# Patient Record
Sex: MALE | Race: WHITE | NOT HISPANIC OR LATINO | Employment: OTHER | RURAL
[De-identification: names, ages, dates, MRNs, and addresses within clinical notes are randomized per-mention and may not be internally consistent; named-entity substitution may affect disease eponyms.]

---

## 2019-07-29 ENCOUNTER — OFFICE VISIT (OUTPATIENT)
Dept: NEUROSURGERY | Facility: CLINIC | Age: 64
End: 2019-07-29

## 2019-07-29 DIAGNOSIS — M51.36 BULGING LUMBAR DISC: ICD-10-CM

## 2019-07-29 DIAGNOSIS — M48.062 SPINAL STENOSIS OF LUMBAR REGION WITH NEUROGENIC CLAUDICATION: Primary | ICD-10-CM

## 2019-07-29 PROCEDURE — 99203 OFFICE O/P NEW LOW 30 MIN: CPT | Performed by: NEUROLOGICAL SURGERY

## 2019-07-29 RX ORDER — LORATADINE 10 MG/1
10 TABLET ORAL DAILY
Refills: 3 | COMMUNITY
Start: 2019-06-19

## 2019-07-29 RX ORDER — HEPATITIS A VACCINE, INACTIVATED 50 [IU]/ML
INJECTION, SUSPENSION INTRAMUSCULAR
Refills: 0 | COMMUNITY
Start: 2019-06-20

## 2019-07-29 RX ORDER — LEVOTHYROXINE SODIUM 0.15 MG/1
150 TABLET ORAL DAILY
Refills: 1 | COMMUNITY
Start: 2019-06-19

## 2019-07-29 RX ORDER — ATORVASTATIN CALCIUM 10 MG/1
10 TABLET, FILM COATED ORAL
Refills: 1 | COMMUNITY
Start: 2019-05-02

## 2019-07-29 RX ORDER — EMPAGLIFLOZIN AND LINAGLIPTIN 25; 5 MG/1; MG/1
1 TABLET, FILM COATED ORAL DAILY
Refills: 3 | COMMUNITY
Start: 2019-06-28

## 2019-07-29 RX ORDER — METFORMIN HYDROCHLORIDE 500 MG/1
500 TABLET, EXTENDED RELEASE ORAL
Refills: 1 | COMMUNITY
Start: 2019-06-28

## 2019-07-29 NOTE — PROGRESS NOTES
Subjective   Patient ID: Marshall Collett is a 64 y.o. male is being seen for consultation today at the request of DELORES Sepulveda  Chief Complaint: Back and left leg pain    History of Present Illness: Patient is a 64-year-old man with complaints of back pain and pain that radiates to his left hip and leg.  It occurs at night as well as when active when lifting or bending or twisting or standing for long periods.  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  He retired because the pain in his back became too much over years of time.  He recently started chiropractic he uses Motrin if necessary, no pain pills.  He has a history of diabetes.    Review of Radiographic Studies:  Lumbar MRI scan shows severe stenosis at L3-4.    The following portions of the patient's history were reviewed, updated as appropriate and approved: allergies, current medications, past family history, past medical history, past social history, past surgical history, review of systems and problem list.    Review of Systems   Constitutional: Negative for activity change, appetite change, chills, diaphoresis, fatigue, fever and unexpected weight change.   HENT: Negative for congestion, dental problem, drooling, ear discharge, ear pain, facial swelling, hearing loss, mouth sores, nosebleeds, postnasal drip, rhinorrhea, sinus pressure, sneezing, sore throat, tinnitus, trouble swallowing and voice change.    Eyes: Negative for photophobia, pain, discharge, redness, itching and visual disturbance.   Respiratory: Negative for apnea, cough, choking, chest tightness, shortness of breath, wheezing and stridor.    Cardiovascular: Negative for chest pain, palpitations and leg swelling.   Gastrointestinal: Negative for abdominal distention, abdominal pain, anal bleeding, blood in stool, constipation, diarrhea, nausea, rectal pain and vomiting.   Endocrine: Negative for cold intolerance, heat intolerance, polydipsia, polyphagia and polyuria.    Genitourinary: Negative for decreased urine volume, difficulty urinating, dysuria, enuresis, flank pain, frequency, genital sores, hematuria and urgency.   Musculoskeletal: Positive for back pain. Negative for arthralgias, gait problem, joint swelling, myalgias, neck pain and neck stiffness.   Skin: Negative for color change, pallor, rash and wound.   Allergic/Immunologic: Negative for environmental allergies, food allergies and immunocompromised state.   Neurological: Negative for dizziness, tremors, seizures, syncope, facial asymmetry, speech difficulty, weakness, light-headedness, numbness and headaches.   Hematological: Negative for adenopathy. Does not bruise/bleed easily.   Psychiatric/Behavioral: Negative for agitation, behavioral problems, confusion, decreased concentration, dysphoric mood, hallucinations, self-injury, sleep disturbance and suicidal ideas. The patient is not nervous/anxious and is not hyperactive.        Objective     NEUROLOGICAL EXAMINATION:      MENTAL STATUS:  Alert and oriented.  Speech intact.  Recent and remote memory intact.      CRANIAL NERVES:  Cranial nerve II:  Visual fields are full.  Cranial nerves III, IV and VI:  PERRLADC.  Extraocular movements are intact.  Nystagmus is not present.  Cranial nerve V:  Facial sensation is intact.  Cranial nerve VII:  Muscles of facial expression reveal no asymmetry.  Cranial nerve VIII:  Hearing is intact.  Cranial nerves IX and X:  Palate elevates symmetrically.  Cranial nerve XI:  Shoulder shrug is intact.  Cranial nerve XII:  Tongue is midline without evidence of atrophy or fasciculation.    MUSCULOSKELETAL:  SLR negative bilaterally    MOTOR: Dorsiflexion and plantarflexion intact bilaterally    SENSATION: No sensory loss over the feet    REFLEXES:  DTR absent bilateral knee and ankle      Assessment   Stenosis L3-4.       Plan   Continue chiropractic over the next 2 months.  Follow-up in [place] in 2 months.  He is a candidate for  minimal access laminectomy if symptoms do not respond to conservative management and require surgical treatment.       Dilip Brothers MD

## 2019-09-16 ENCOUNTER — OFFICE VISIT (OUTPATIENT)
Dept: NEUROSURGERY | Facility: CLINIC | Age: 64
End: 2019-09-16

## 2019-09-16 DIAGNOSIS — M48.062 SPINAL STENOSIS OF LUMBAR REGION WITH NEUROGENIC CLAUDICATION: Primary | ICD-10-CM

## 2019-09-16 PROCEDURE — 99213 OFFICE O/P EST LOW 20 MIN: CPT | Performed by: NEUROLOGICAL SURGERY

## 2019-09-16 NOTE — PROGRESS NOTES
Subjective Marshall Collett is a 64 y.o. male who presents for follow up of L3-4 stenosis.     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  Chiropractic has not been successful in alleviating symptoms.  MRI scan shows severe stenosis at L3-4.  He uses anti-inflammatories, not narcotics.    The following portions of the patient's history were reviewed, updated as appropriate and approved: allergies, current medications, past family history, past medical history, past social history, past surgical history, review of systems and problem list.     Review of Systems   Constitutional: Negative for activity change, appetite change, chills, diaphoresis, fatigue, fever and unexpected weight change.   HENT: Negative for congestion, dental problem, drooling, ear discharge, ear pain, facial swelling, hearing loss, mouth sores, nosebleeds, postnasal drip, rhinorrhea, sinus pressure, sneezing, sore throat, tinnitus, trouble swallowing and voice change.    Eyes: Negative for photophobia, pain, discharge, redness, itching and visual disturbance.   Respiratory: Negative for apnea, cough, choking, chest tightness, shortness of breath, wheezing and stridor.    Cardiovascular: Negative for chest pain, palpitations and leg swelling.   Gastrointestinal: Negative for abdominal distention, abdominal pain, anal bleeding, blood in stool, constipation, diarrhea, nausea, rectal pain and vomiting.   Endocrine: Negative for cold intolerance, heat intolerance, polydipsia, polyphagia and polyuria.   Genitourinary: Negative for decreased urine volume, difficulty urinating, dysuria, enuresis, flank pain, frequency, genital sores, hematuria and urgency.   Musculoskeletal: Positive for back pain. Negative for arthralgias, gait problem, joint swelling, myalgias, neck pain and neck stiffness.   Skin: Negative for color change, pallor, rash and wound.    Allergic/Immunologic: Negative for environmental allergies, food allergies and immunocompromised state.   Neurological: Negative for dizziness, tremors, seizures, syncope, facial asymmetry, speech difficulty, weakness, light-headedness, numbness and headaches.   Hematological: Negative for adenopathy. Does not bruise/bleed easily.   Psychiatric/Behavioral: Negative for agitation, behavioral problems, confusion, decreased concentration, dysphoric mood, hallucinations, self-injury, sleep disturbance and suicidal ideas. The patient is not nervous/anxious and is not hyperactive.        Objective    NEUROLOGICAL EXAMINATION:    Intact dorsiflexion and plantar flexion bilaterally.  SLR negative.  No sensory loss.  Absent reflexes in the lower extremities.    Assessment   Severe stenosis L3-4, left back and leg pain.       Plan   It is time to consider minimal access decompressive laminectomy at L3-4.  Have discussed this with him.  He will give it consideration and if he decides to have surgery let us know, at which time we will schedule the surgery.       Dilip Brothers MD

## 2019-11-19 ENCOUNTER — PREP FOR SURGERY (OUTPATIENT)
Dept: OTHER | Facility: HOSPITAL | Age: 64
End: 2019-11-19

## 2019-11-19 DIAGNOSIS — R93.7 MUSCULOSKELETAL SYSTEM IMAGING ABNORMALITY: ICD-10-CM

## 2019-11-19 DIAGNOSIS — M48.062 SPINAL STENOSIS OF LUMBAR REGION WITH NEUROGENIC CLAUDICATION: Primary | ICD-10-CM

## 2019-11-19 RX ORDER — IBUPROFEN 800 MG/1
800 TABLET ORAL ONCE
Status: CANCELLED | OUTPATIENT
Start: 2019-11-19 | End: 2019-11-19

## 2019-11-19 RX ORDER — HYDROCODONE BITARTRATE AND ACETAMINOPHEN 7.5; 325 MG/1; MG/1
1 TABLET ORAL ONCE
Status: CANCELLED | OUTPATIENT
Start: 2019-11-19 | End: 2019-11-19

## 2019-11-19 RX ORDER — CEFAZOLIN SODIUM 2 G/100ML
2 INJECTION, SOLUTION INTRAVENOUS ONCE
Status: CANCELLED | OUTPATIENT
Start: 2019-11-19 | End: 2019-11-19

## 2019-11-19 RX ORDER — ACETAMINOPHEN 325 MG/1
650 TABLET ORAL ONCE
Status: CANCELLED | OUTPATIENT
Start: 2019-11-19 | End: 2019-11-19

## 2019-12-12 ENCOUNTER — PREP FOR SURGERY (OUTPATIENT)
Dept: OTHER | Facility: HOSPITAL | Age: 64
End: 2019-12-12

## 2020-01-09 ENCOUNTER — ANESTHESIA EVENT (OUTPATIENT)
Dept: PERIOP | Facility: HOSPITAL | Age: 65
End: 2020-01-09

## 2020-01-09 ENCOUNTER — APPOINTMENT (OUTPATIENT)
Dept: PREADMISSION TESTING | Facility: HOSPITAL | Age: 65
End: 2020-01-09

## 2020-01-09 VITALS — WEIGHT: 200.4 LBS | BODY MASS INDEX: 25.72 KG/M2 | HEIGHT: 74 IN

## 2020-01-09 DIAGNOSIS — R93.7 MUSCULOSKELETAL SYSTEM IMAGING ABNORMALITY: ICD-10-CM

## 2020-01-09 DIAGNOSIS — M48.062 SPINAL STENOSIS OF LUMBAR REGION WITH NEUROGENIC CLAUDICATION: ICD-10-CM

## 2020-01-09 LAB
ANION GAP SERPL CALCULATED.3IONS-SCNC: 13 MMOL/L (ref 5–15)
BUN BLD-MCNC: 26 MG/DL (ref 8–23)
BUN/CREAT SERPL: 29.5 (ref 7–25)
CALCIUM SPEC-SCNC: 10.4 MG/DL (ref 8.6–10.5)
CHLORIDE SERPL-SCNC: 98 MMOL/L (ref 98–107)
CO2 SERPL-SCNC: 23 MMOL/L (ref 22–29)
CREAT BLD-MCNC: 0.88 MG/DL (ref 0.76–1.27)
DEPRECATED RDW RBC AUTO: 40.4 FL (ref 37–54)
ERYTHROCYTE [DISTWIDTH] IN BLOOD BY AUTOMATED COUNT: 12.7 % (ref 12.3–15.4)
GFR SERPL CREATININE-BSD FRML MDRD: 87 ML/MIN/1.73
GLUCOSE BLD-MCNC: 229 MG/DL (ref 65–99)
HBA1C MFR BLD: 9.4 % (ref 4.8–5.6)
HCT VFR BLD AUTO: 48.7 % (ref 37.5–51)
HGB BLD-MCNC: 16.5 G/DL (ref 13–17.7)
MCH RBC QN AUTO: 29.6 PG (ref 26.6–33)
MCHC RBC AUTO-ENTMCNC: 33.9 G/DL (ref 31.5–35.7)
MCV RBC AUTO: 87.3 FL (ref 79–97)
MRSA DNA SPEC QL NAA+PROBE: NEGATIVE
PLATELET # BLD AUTO: 205 10*3/MM3 (ref 140–450)
PMV BLD AUTO: 9.5 FL (ref 6–12)
POTASSIUM BLD-SCNC: 4.5 MMOL/L (ref 3.5–5.2)
RBC # BLD AUTO: 5.58 10*6/MM3 (ref 4.14–5.8)
SODIUM BLD-SCNC: 134 MMOL/L (ref 136–145)
WBC NRBC COR # BLD: 7.99 10*3/MM3 (ref 3.4–10.8)

## 2020-01-09 PROCEDURE — 87641 MR-STAPH DNA AMP PROBE: CPT | Performed by: NEUROLOGICAL SURGERY

## 2020-01-09 PROCEDURE — 80048 BASIC METABOLIC PNL TOTAL CA: CPT | Performed by: NEUROLOGICAL SURGERY

## 2020-01-09 PROCEDURE — 93010 ELECTROCARDIOGRAM REPORT: CPT | Performed by: INTERNAL MEDICINE

## 2020-01-09 PROCEDURE — 36415 COLL VENOUS BLD VENIPUNCTURE: CPT

## 2020-01-09 PROCEDURE — 83036 HEMOGLOBIN GLYCOSYLATED A1C: CPT | Performed by: ANESTHESIOLOGY

## 2020-01-09 PROCEDURE — 93005 ELECTROCARDIOGRAM TRACING: CPT

## 2020-01-09 PROCEDURE — 85027 COMPLETE CBC AUTOMATED: CPT | Performed by: NEUROLOGICAL SURGERY

## 2020-01-09 RX ORDER — FAMOTIDINE 10 MG/ML
20 INJECTION, SOLUTION INTRAVENOUS ONCE
Status: CANCELLED | OUTPATIENT
Start: 2020-01-09 | End: 2020-01-09

## 2020-01-09 NOTE — PAT
BACTROBAN APPLIED TO EACH NOSTRIL DURING PAT VISIT     Patient to apply Chlorhexadine wipes  to surgical area (as instructed) the night before procedure and the AM of procedure. Wipes provided.    Patient instructed to drink 20 ounces (or until full) of Gatorade and it needs to be completed 1 hour before given arrival time for procedure (NO RED Gatorade)    Patient verbalized understanding.    Colon screening information booklet given to patient during PAT visit    IZABEL REQUESTED BEFORE PROCEDURE. NOTE LEFT FOR PREOP.

## 2020-01-10 ENCOUNTER — ANESTHESIA (OUTPATIENT)
Dept: PERIOP | Facility: HOSPITAL | Age: 65
End: 2020-01-10

## 2020-01-10 ENCOUNTER — HOSPITAL ENCOUNTER (OUTPATIENT)
Facility: HOSPITAL | Age: 65
Discharge: HOME | End: 2020-01-11
Attending: NEUROLOGICAL SURGERY | Admitting: NEUROLOGICAL SURGERY

## 2020-01-10 ENCOUNTER — APPOINTMENT (OUTPATIENT)
Dept: GENERAL RADIOLOGY | Facility: HOSPITAL | Age: 65
End: 2020-01-10

## 2020-01-10 DIAGNOSIS — Z74.09 IMPAIRED MOBILITY AND ADLS: Primary | ICD-10-CM

## 2020-01-10 DIAGNOSIS — Z78.9 IMPAIRED MOBILITY AND ADLS: Primary | ICD-10-CM

## 2020-01-10 PROBLEM — M48.061 LUMBAR STENOSIS: Status: ACTIVE | Noted: 2020-01-10

## 2020-01-10 LAB
GLUCOSE BLDC GLUCOMTR-MCNC: 217 MG/DL (ref 70–130)
GLUCOSE BLDC GLUCOMTR-MCNC: 277 MG/DL (ref 70–130)

## 2020-01-10 PROCEDURE — A9270 NON-COVERED ITEM OR SERVICE: HCPCS | Performed by: NEUROLOGICAL SURGERY

## 2020-01-10 PROCEDURE — 63710000001 ATORVASTATIN 10 MG TABLET: Performed by: NEUROLOGICAL SURGERY

## 2020-01-10 PROCEDURE — 25010000002 NEOSTIGMINE 10 MG/10ML SOLUTION: Performed by: NURSE ANESTHETIST, CERTIFIED REGISTERED

## 2020-01-10 PROCEDURE — 63710000001 ACETAMINOPHEN 325 MG TABLET: Performed by: NEUROLOGICAL SURGERY

## 2020-01-10 PROCEDURE — 25010000003 CEFAZOLIN IN DEXTROSE 2-4 GM/100ML-% SOLUTION: Performed by: NEUROLOGICAL SURGERY

## 2020-01-10 PROCEDURE — 25010000002 PROPOFOL 10 MG/ML EMULSION: Performed by: NURSE ANESTHETIST, CERTIFIED REGISTERED

## 2020-01-10 PROCEDURE — 25010000002 PROMETHAZINE PER 50 MG: Performed by: NURSE ANESTHETIST, CERTIFIED REGISTERED

## 2020-01-10 PROCEDURE — 94799 UNLISTED PULMONARY SVC/PX: CPT

## 2020-01-10 PROCEDURE — 76000 FLUOROSCOPY <1 HR PHYS/QHP: CPT

## 2020-01-10 PROCEDURE — 25010000002 FENTANYL CITRATE (PF) 250 MCG/5ML SOLUTION: Performed by: NURSE ANESTHETIST, CERTIFIED REGISTERED

## 2020-01-10 PROCEDURE — 25010000002 ONDANSETRON PER 1 MG: Performed by: NURSE ANESTHETIST, CERTIFIED REGISTERED

## 2020-01-10 PROCEDURE — 82962 GLUCOSE BLOOD TEST: CPT

## 2020-01-10 PROCEDURE — 63047 LAM FACETEC & FORAMOT LUMBAR: CPT | Performed by: NEUROLOGICAL SURGERY

## 2020-01-10 PROCEDURE — 25010000002 DEXAMETHASONE PER 1 MG: Performed by: NURSE ANESTHETIST, CERTIFIED REGISTERED

## 2020-01-10 PROCEDURE — 25010000002 METHYLPREDNISOLONE PER 40 MG: Performed by: NEUROLOGICAL SURGERY

## 2020-01-10 PROCEDURE — S0260 H&P FOR SURGERY: HCPCS | Performed by: NEUROLOGICAL SURGERY

## 2020-01-10 PROCEDURE — 63710000001 METFORMIN ER 500 MG TABLET SUSTAINED-RELEASE 24 HOUR: Performed by: NEUROLOGICAL SURGERY

## 2020-01-10 PROCEDURE — 25010000002 HYDROMORPHONE PER 4 MG: Performed by: NURSE ANESTHETIST, CERTIFIED REGISTERED

## 2020-01-10 RX ORDER — SODIUM CHLORIDE 0.9 % (FLUSH) 0.9 %
10 SYRINGE (ML) INJECTION AS NEEDED
Status: DISCONTINUED | OUTPATIENT
Start: 2020-01-10 | End: 2020-01-10 | Stop reason: HOSPADM

## 2020-01-10 RX ORDER — NEOSTIGMINE METHYLSULFATE 1 MG/ML
INJECTION, SOLUTION INTRAVENOUS AS NEEDED
Status: DISCONTINUED | OUTPATIENT
Start: 2020-01-10 | End: 2020-01-10 | Stop reason: SURG

## 2020-01-10 RX ORDER — METFORMIN HYDROCHLORIDE 500 MG/1
500 TABLET, EXTENDED RELEASE ORAL
Status: DISCONTINUED | OUTPATIENT
Start: 2020-01-10 | End: 2020-01-11 | Stop reason: HOSPADM

## 2020-01-10 RX ORDER — ATRACURIUM BESYLATE 10 MG/ML
INJECTION, SOLUTION INTRAVENOUS AS NEEDED
Status: DISCONTINUED | OUTPATIENT
Start: 2020-01-10 | End: 2020-01-10 | Stop reason: SURG

## 2020-01-10 RX ORDER — HYDROCODONE BITARTRATE AND ACETAMINOPHEN 7.5; 325 MG/1; MG/1
1 TABLET ORAL ONCE
Status: COMPLETED | OUTPATIENT
Start: 2020-01-10 | End: 2020-01-10

## 2020-01-10 RX ORDER — SODIUM CHLORIDE 0.9 % (FLUSH) 0.9 %
3 SYRINGE (ML) INJECTION EVERY 12 HOURS SCHEDULED
Status: DISCONTINUED | OUTPATIENT
Start: 2020-01-10 | End: 2020-01-11 | Stop reason: HOSPADM

## 2020-01-10 RX ORDER — EPHEDRINE SULFATE 50 MG/ML
INJECTION, SOLUTION INTRAVENOUS AS NEEDED
Status: DISCONTINUED | OUTPATIENT
Start: 2020-01-10 | End: 2020-01-10 | Stop reason: SURG

## 2020-01-10 RX ORDER — HYDROMORPHONE HYDROCHLORIDE 1 MG/ML
0.5 INJECTION, SOLUTION INTRAMUSCULAR; INTRAVENOUS; SUBCUTANEOUS
Status: DISCONTINUED | OUTPATIENT
Start: 2020-01-10 | End: 2020-01-10 | Stop reason: HOSPADM

## 2020-01-10 RX ORDER — AMOXICILLIN 250 MG
1 CAPSULE ORAL NIGHTLY PRN
Status: DISCONTINUED | OUTPATIENT
Start: 2020-01-10 | End: 2020-01-11 | Stop reason: HOSPADM

## 2020-01-10 RX ORDER — ONDANSETRON 2 MG/ML
INJECTION INTRAMUSCULAR; INTRAVENOUS AS NEEDED
Status: DISCONTINUED | OUTPATIENT
Start: 2020-01-10 | End: 2020-01-10 | Stop reason: SURG

## 2020-01-10 RX ORDER — LIDOCAINE HYDROCHLORIDE 10 MG/ML
0.5 INJECTION, SOLUTION EPIDURAL; INFILTRATION; INTRACAUDAL; PERINEURAL ONCE AS NEEDED
Status: COMPLETED | OUTPATIENT
Start: 2020-01-10 | End: 2020-01-10

## 2020-01-10 RX ORDER — PROMETHAZINE HYDROCHLORIDE 25 MG/ML
6.25 INJECTION, SOLUTION INTRAMUSCULAR; INTRAVENOUS
Status: DISCONTINUED | OUTPATIENT
Start: 2020-01-10 | End: 2020-01-10 | Stop reason: HOSPADM

## 2020-01-10 RX ORDER — OXYCODONE AND ACETAMINOPHEN 10; 325 MG/1; MG/1
1 TABLET ORAL EVERY 4 HOURS PRN
Status: DISCONTINUED | OUTPATIENT
Start: 2020-01-10 | End: 2020-01-11 | Stop reason: HOSPADM

## 2020-01-10 RX ORDER — LEVOTHYROXINE SODIUM 0.15 MG/1
150 TABLET ORAL
Status: DISCONTINUED | OUTPATIENT
Start: 2020-01-10 | End: 2020-01-11 | Stop reason: HOSPADM

## 2020-01-10 RX ORDER — BUPIVACAINE HYDROCHLORIDE AND EPINEPHRINE 2.5; 5 MG/ML; UG/ML
INJECTION, SOLUTION EPIDURAL; INFILTRATION; INTRACAUDAL; PERINEURAL AS NEEDED
Status: DISCONTINUED | OUTPATIENT
Start: 2020-01-10 | End: 2020-01-10 | Stop reason: HOSPADM

## 2020-01-10 RX ORDER — FENTANYL CITRATE 50 UG/ML
50 INJECTION, SOLUTION INTRAMUSCULAR; INTRAVENOUS
Status: DISCONTINUED | OUTPATIENT
Start: 2020-01-10 | End: 2020-01-10 | Stop reason: HOSPADM

## 2020-01-10 RX ORDER — ONDANSETRON 2 MG/ML
4 INJECTION INTRAMUSCULAR; INTRAVENOUS ONCE AS NEEDED
Status: DISCONTINUED | OUTPATIENT
Start: 2020-01-10 | End: 2020-01-10 | Stop reason: HOSPADM

## 2020-01-10 RX ORDER — SODIUM CHLORIDE 0.9 % (FLUSH) 0.9 %
10 SYRINGE (ML) INJECTION AS NEEDED
Status: DISCONTINUED | OUTPATIENT
Start: 2020-01-10 | End: 2020-01-11 | Stop reason: HOSPADM

## 2020-01-10 RX ORDER — LIDOCAINE HYDROCHLORIDE 10 MG/ML
INJECTION, SOLUTION EPIDURAL; INFILTRATION; INTRACAUDAL; PERINEURAL AS NEEDED
Status: DISCONTINUED | OUTPATIENT
Start: 2020-01-10 | End: 2020-01-10 | Stop reason: SURG

## 2020-01-10 RX ORDER — ONDANSETRON 2 MG/ML
4 INJECTION INTRAMUSCULAR; INTRAVENOUS EVERY 6 HOURS PRN
Status: DISCONTINUED | OUTPATIENT
Start: 2020-01-10 | End: 2020-01-11 | Stop reason: HOSPADM

## 2020-01-10 RX ORDER — ONDANSETRON 4 MG/1
4 TABLET, FILM COATED ORAL EVERY 6 HOURS PRN
Status: DISCONTINUED | OUTPATIENT
Start: 2020-01-10 | End: 2020-01-11 | Stop reason: HOSPADM

## 2020-01-10 RX ORDER — SODIUM CHLORIDE 0.9 % (FLUSH) 0.9 %
10 SYRINGE (ML) INJECTION EVERY 12 HOURS SCHEDULED
Status: DISCONTINUED | OUTPATIENT
Start: 2020-01-10 | End: 2020-01-10 | Stop reason: HOSPADM

## 2020-01-10 RX ORDER — FENTANYL CITRATE 50 UG/ML
INJECTION, SOLUTION INTRAMUSCULAR; INTRAVENOUS AS NEEDED
Status: DISCONTINUED | OUTPATIENT
Start: 2020-01-10 | End: 2020-01-10 | Stop reason: SURG

## 2020-01-10 RX ORDER — IBUPROFEN 800 MG/1
800 TABLET ORAL ONCE
Status: COMPLETED | OUTPATIENT
Start: 2020-01-10 | End: 2020-01-10

## 2020-01-10 RX ORDER — TEMAZEPAM 15 MG/1
15 CAPSULE ORAL NIGHTLY PRN
Status: DISCONTINUED | OUTPATIENT
Start: 2020-01-10 | End: 2020-01-11 | Stop reason: HOSPADM

## 2020-01-10 RX ORDER — LABETALOL HYDROCHLORIDE 5 MG/ML
5 INJECTION, SOLUTION INTRAVENOUS
Status: DISCONTINUED | OUTPATIENT
Start: 2020-01-10 | End: 2020-01-10 | Stop reason: HOSPADM

## 2020-01-10 RX ORDER — METHYLPREDNISOLONE ACETATE 40 MG/ML
INJECTION, SUSPENSION INTRA-ARTICULAR; INTRALESIONAL; INTRAMUSCULAR; SOFT TISSUE AS NEEDED
Status: DISCONTINUED | OUTPATIENT
Start: 2020-01-10 | End: 2020-01-10 | Stop reason: HOSPADM

## 2020-01-10 RX ORDER — ACETAMINOPHEN 325 MG/1
650 TABLET ORAL ONCE
Status: COMPLETED | OUTPATIENT
Start: 2020-01-10 | End: 2020-01-10

## 2020-01-10 RX ORDER — MAGNESIUM HYDROXIDE 1200 MG/15ML
LIQUID ORAL AS NEEDED
Status: DISCONTINUED | OUTPATIENT
Start: 2020-01-10 | End: 2020-01-10 | Stop reason: HOSPADM

## 2020-01-10 RX ORDER — PROPOFOL 10 MG/ML
VIAL (ML) INTRAVENOUS AS NEEDED
Status: DISCONTINUED | OUTPATIENT
Start: 2020-01-10 | End: 2020-01-10 | Stop reason: SURG

## 2020-01-10 RX ORDER — CEFAZOLIN SODIUM 2 G/100ML
2 INJECTION, SOLUTION INTRAVENOUS ONCE
Status: COMPLETED | OUTPATIENT
Start: 2020-01-10 | End: 2020-01-10

## 2020-01-10 RX ORDER — FAMOTIDINE 20 MG/1
20 TABLET, FILM COATED ORAL ONCE
Status: COMPLETED | OUTPATIENT
Start: 2020-01-10 | End: 2020-01-10

## 2020-01-10 RX ORDER — DEXAMETHASONE SODIUM PHOSPHATE 4 MG/ML
INJECTION, SOLUTION INTRA-ARTICULAR; INTRALESIONAL; INTRAMUSCULAR; INTRAVENOUS; SOFT TISSUE AS NEEDED
Status: DISCONTINUED | OUTPATIENT
Start: 2020-01-10 | End: 2020-01-10 | Stop reason: SURG

## 2020-01-10 RX ORDER — GLYCOPYRROLATE 0.2 MG/ML
INJECTION INTRAMUSCULAR; INTRAVENOUS AS NEEDED
Status: DISCONTINUED | OUTPATIENT
Start: 2020-01-10 | End: 2020-01-10 | Stop reason: SURG

## 2020-01-10 RX ORDER — ACETAMINOPHEN 325 MG/1
650 TABLET ORAL EVERY 8 HOURS
Status: DISCONTINUED | OUTPATIENT
Start: 2020-01-10 | End: 2020-01-11 | Stop reason: HOSPADM

## 2020-01-10 RX ORDER — ATORVASTATIN CALCIUM 10 MG/1
10 TABLET, FILM COATED ORAL NIGHTLY
Status: DISCONTINUED | OUTPATIENT
Start: 2020-01-10 | End: 2020-01-11 | Stop reason: HOSPADM

## 2020-01-10 RX ORDER — HYDROCODONE BITARTRATE AND ACETAMINOPHEN 7.5; 325 MG/1; MG/1
1 TABLET ORAL EVERY 4 HOURS PRN
Status: DISCONTINUED | OUTPATIENT
Start: 2020-01-10 | End: 2020-01-11 | Stop reason: HOSPADM

## 2020-01-10 RX ORDER — SODIUM CHLORIDE, SODIUM LACTATE, POTASSIUM CHLORIDE, CALCIUM CHLORIDE 600; 310; 30; 20 MG/100ML; MG/100ML; MG/100ML; MG/100ML
9 INJECTION, SOLUTION INTRAVENOUS CONTINUOUS
Status: DISCONTINUED | OUTPATIENT
Start: 2020-01-10 | End: 2020-01-11 | Stop reason: HOSPADM

## 2020-01-10 RX ORDER — CYCLOBENZAPRINE HCL 10 MG
10 TABLET ORAL 3 TIMES DAILY PRN
Status: DISCONTINUED | OUTPATIENT
Start: 2020-01-10 | End: 2020-01-11 | Stop reason: HOSPADM

## 2020-01-10 RX ADMIN — FENTANYL CITRATE 100 MCG: 50 INJECTION, SOLUTION INTRAMUSCULAR; INTRAVENOUS at 09:34

## 2020-01-10 RX ADMIN — SODIUM CHLORIDE, PRESERVATIVE FREE 3 ML: 5 INJECTION INTRAVENOUS at 20:28

## 2020-01-10 RX ADMIN — IBUPROFEN 800 MG: 800 TABLET, FILM COATED ORAL at 07:24

## 2020-01-10 RX ADMIN — FAMOTIDINE 20 MG: 20 TABLET ORAL at 07:26

## 2020-01-10 RX ADMIN — NEOSTIGMINE METHYLSULFATE 2 MG: 1 INJECTION, SOLUTION INTRAVENOUS at 09:32

## 2020-01-10 RX ADMIN — HYDROMORPHONE HYDROCHLORIDE 0.5 MG: 1 INJECTION, SOLUTION INTRAMUSCULAR; INTRAVENOUS; SUBCUTANEOUS at 10:27

## 2020-01-10 RX ADMIN — ONDANSETRON 4 MG: 2 INJECTION INTRAMUSCULAR; INTRAVENOUS at 09:32

## 2020-01-10 RX ADMIN — FENTANYL CITRATE 150 MCG: 50 INJECTION, SOLUTION INTRAMUSCULAR; INTRAVENOUS at 08:36

## 2020-01-10 RX ADMIN — ATRACURIUM BESYLATE 50 MG: 10 INJECTION, SOLUTION INTRAVENOUS at 08:36

## 2020-01-10 RX ADMIN — ACETAMINOPHEN 650 MG: 325 TABLET ORAL at 20:27

## 2020-01-10 RX ADMIN — PROPOFOL 200 MG: 10 INJECTION, EMULSION INTRAVENOUS at 08:36

## 2020-01-10 RX ADMIN — EPHEDRINE SULFATE 10 MG: 50 INJECTION INTRAVENOUS at 09:07

## 2020-01-10 RX ADMIN — HYDROCODONE BITARTRATE AND ACETAMINOPHEN 1 TABLET: 7.5; 325 TABLET ORAL at 07:26

## 2020-01-10 RX ADMIN — METFORMIN HYDROCHLORIDE 500 MG: 500 TABLET, EXTENDED RELEASE ORAL at 12:45

## 2020-01-10 RX ADMIN — CEFAZOLIN SODIUM 2 G: 2 INJECTION, SOLUTION INTRAVENOUS at 08:45

## 2020-01-10 RX ADMIN — SODIUM CHLORIDE, POTASSIUM CHLORIDE, SODIUM LACTATE AND CALCIUM CHLORIDE: 600; 310; 30; 20 INJECTION, SOLUTION INTRAVENOUS at 09:33

## 2020-01-10 RX ADMIN — SODIUM CHLORIDE, POTASSIUM CHLORIDE, SODIUM LACTATE AND CALCIUM CHLORIDE: 600; 310; 30; 20 INJECTION, SOLUTION INTRAVENOUS at 07:17

## 2020-01-10 RX ADMIN — ATORVASTATIN CALCIUM 10 MG: 10 TABLET, FILM COATED ORAL at 20:27

## 2020-01-10 RX ADMIN — PROMETHAZINE HYDROCHLORIDE 6.25 MG: 25 INJECTION INTRAMUSCULAR; INTRAVENOUS at 10:20

## 2020-01-10 RX ADMIN — ACETAMINOPHEN 650 MG: 325 TABLET ORAL at 13:30

## 2020-01-10 RX ADMIN — ACETAMINOPHEN 650 MG: 325 TABLET ORAL at 07:24

## 2020-01-10 RX ADMIN — GLYCOPYRROLATE 0.2 MG: 0.2 INJECTION, SOLUTION INTRAMUSCULAR; INTRAVENOUS at 09:32

## 2020-01-10 RX ADMIN — LIDOCAINE HYDROCHLORIDE 0.4 ML: 10 INJECTION, SOLUTION EPIDURAL; INFILTRATION; INTRACAUDAL; PERINEURAL at 07:25

## 2020-01-10 RX ADMIN — DEXAMETHASONE SODIUM PHOSPHATE 4 MG: 4 INJECTION, SOLUTION INTRAMUSCULAR; INTRAVENOUS at 08:41

## 2020-01-10 RX ADMIN — LIDOCAINE HYDROCHLORIDE 60 MG: 10 INJECTION, SOLUTION EPIDURAL; INFILTRATION; INTRACAUDAL; PERINEURAL at 08:36

## 2020-01-10 NOTE — ANESTHESIA PROCEDURE NOTES
Airway  Urgency: elective    Airway not difficult    General Information and Staff    Patient location during procedure: OR    Indications and Patient Condition  Indications for airway management: airway protection    Preoxygenated: yes  MILS not maintained throughout  Mask difficulty assessment: 1 - vent by mask    Final Airway Details  Final airway type: endotracheal airway      Successful airway: ETT  Cuffed: yes   Successful intubation technique: direct laryngoscopy  Endotracheal tube insertion site: oral  Blade: Marco A  Blade size: 3  ETT size (mm): 7.5  Cormack-Lehane Classification: grade I - full view of glottis  Placement verified by: chest auscultation and capnometry   Number of attempts at approach: 1

## 2020-01-10 NOTE — OP NOTE
OPERATIVE REPORT    DATE OF OPERATION: 1/10/2020    PREOPERATIVE DIAGNOSIS: Lumbar stenosis L3-4    POSTOPERATIVE DIAGNOSIS: Same    PROCEDURE PERFORMED: Lumbar laminectomy L3-4    SURGEON: Dilip Brothers MD    ASSISTANT: Shima Palacio PA-C    INDICATIONS: The patient had back pain radiating hips and legs, principally on the left.  MRI scan showed significant lumbar stenosis at L3-4 causing neurogenic claudication.  Decompression using minimal access technique was elected.    DESCRIPTON OF PROCEDURE: Surgery was performed under general anesthesia in the prone position on the laminectomy frame.  The back was prepared sterilely and draped.  The left lower L3 lamina was identified with a spinal needle and an AP fluoroscopic image.  A short skin incision was made and a guidewire and dilators were used to place a 5 cm length x 20 mm width tubular retractor.  AP fluoroscopy confirmed this to be the L3-4 level on the left.    A high-speed drill was used to perform the laminectomy, beginning on the ipsilateral side, and removing the inferior lamina of L 3.  Drilling was extended to the floor of the lateral recess on the ipsilateral side, thinning the medial margin of the facet, then the tube was angled across the midline to the contralateral side and drilling continued, removing the lamina until the ligamentum flavum was exposed and the lateral recess on the far side reached. The ligamentum flavum was exposed bilaterally. The laminectomy was extended cranially to the point where the ligamentum flavum thinned away and epidural fat was visible. The exposure was extended caudally to the level of the superior lamina of L 4.  A 3 mm punch was used to remove the ligamentum flavum, beginning at the midline and partially removing the ligamentum on the ipsilateral side to expose the dura, then removing the ligamentum extensively into the lateral recess on the contralateral side until the floor of the lateral recess and the epidural  veins were seen.  Gelfoam was placed in the far lateral recess for hemostasis.  The tube was angled back to the ipsilateral side and the ligamentum flavum excision completed with the Kerrison punch until the floor of the lateral recess and the epidural veins were seen and all dural compression was confirmed visually to be alleviated.  Removal of the ligament was extended caudally to the superior lamina of L 4.  The bone was waxed as necessary for hemostasis.  The site was irrigated and Gelfoam used as necessary to complete hemostasis.  Gelfoam was placed over the laminectomy site.  The tubular retractor was removed.  Marcaine was injected in the paraspinous muscle.  Subcuticular Vicryl closure was performed and glue was applied to the skin.  Blood loss was 20 milliliters.    Dilip Brothers M.D.

## 2020-01-10 NOTE — BRIEF OP NOTE
LUMBAR DISCECTOMY MICRO ENDOSCOPIC  Progress Note    Bao SALLY Tinajerott  1/10/2020    Pre-op Diagnosis:   Spinal stenosis of lumbar region with neurogenic claudication [M48.062]       Post-Op Diagnosis Codes:     * Spinal stenosis of lumbar region with neurogenic claudication [M48.062]    Procedure/CPT® Codes:      Procedure(s):  LUMBAR LAMINECTOMY L3-4    Surgeon(s):  Dilip Brothers MD    Anesthesia: General    Staff:   Circulator: Lesli Hoang RN  Scrub Person: Leticia Castro Paula M  Nursing Assistant: Nura Arredondo  Assistant: Shima Palacio PA-C    Estimated Blood Loss: 25 cc    Urine Voided: * No values recorded between 1/10/2020  8:32 AM and 1/10/2020  9:34 AM *    Specimens:                None          Drains: * No LDAs found *    Findings: Stenosis L3-4    Complications: None      Dilip Brothers MD     Date: 1/10/2020  Time: 9:34 AM

## 2020-01-10 NOTE — ANESTHESIA PREPROCEDURE EVALUATION
Anesthesia Evaluation     Patient summary reviewed and Nursing notes reviewed   no history of anesthetic complications:  NPO Solid Status: > 8 hours  NPO Liquid Status: > 2 hours           Airway   Mallampati: II  TM distance: >3 FB  Neck ROM: full  No difficulty expected  Dental - normal exam     Pulmonary - negative pulmonary ROS and normal exam     ROS comment: Smokeless tobacco  Cardiovascular - normal exam  Exercise tolerance: good (4-7 METS)    ECG reviewed    (+) hyperlipidemia,   (-) hypertension, valvular problems/murmurs, dysrhythmias, angina, [place], cardiac stents      Neuro/Psych- negative ROS  GI/Hepatic/Renal/Endo    (+)   diabetes mellitus, thyroid problem hypothyroidism  (-) GERD, hepatitis, liver disease, no renal disease    Musculoskeletal     (+) back pain,   Abdominal    Substance History      OB/GYN          Other   arthritis,                    Anesthesia Plan    ASA 3     general     intravenous induction     Anesthetic plan, all risks, benefits, and alternatives have been provided, discussed and informed consent has been obtained with: patient.    Plan discussed with CRNA.

## 2020-01-10 NOTE — H&P
Pre-Op H&P  Marshall C Collett  [id number]  1955    Chief complaint: Low back pain into BLE, L>R    HPI:    9/16/19 office visit:  Marshall Collett is a 64 y.o. male who presents for follow up of L3-4 stenosis.      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  Chiropractic has not been successful in alleviating symptoms.  MRI scan shows severe stenosis at L3-4.  He uses anti-inflammatories, not narcotics.    1/10/20:  Here today to undergo L3-4 lumbar laminectomy    Review of Systems:  General ROS: negative for chills, fever or skin lesions;  No changes since last office visit  Cardiovascular ROS: no chest pain or dyspnea on exertion  Respiratory ROS: no cough, shortness of breath, or wheezing    Allergies: No Known Allergies    Home Meds:    No current facility-administered medications on file prior to encounter.      Current Outpatient Medications on File Prior to Encounter   Medication Sig Dispense Refill   • ALLERGY RELIEF 10 MG tablet Take 10 mg by mouth Daily.  3   • atorvastatin (LIPITOR) 10 MG tablet Take 10 mg by mouth every night at bedtime.  1   • GLYXAMBI 25-5 MG tablet Take 1 tablet by mouth Daily.  3   • levothyroxine (SYNTHROID, LEVOTHROID) 150 MCG tablet Take 150 mcg by mouth Daily.  1   • metFORMIN ER (GLUCOPHAGE-XR) 500 MG 24 hr tablet Take 500 mg by mouth Daily With Breakfast.  1   • VAQTA 50 UNIT/ML injection PHARMACIST ADMINISTERED IMMUNIZATION ADMINISTERED AT TIME OF DISPENSING  0       PMH:   Past Medical History:   Diagnosis Date   • Arthritis    • Diabetes mellitus (CMS/HCC)     DX AGE 39, CHECKS BS 2-3X/MONTH   • Disease of thyroid gland    • Hyperlipidemia      PSH:    Past Surgical History:   Procedure Laterality Date   • HEMORRHOIDECTOMY       Social History:   Tobacco:   Social History     Tobacco Use   Smoking Status Never Smoker   Smokeless Tobacco Former User   • Types: Chew       Alcohol:     Social History     Substance and Sexual Activity   Alcohol Use Never   • Frequency: Never       Vitals:           /77 (BP Location: Right arm, Patient Position: Lying)   Pulse 74   Temp 97.7 °F (36.5 °C) (Tympanic)   Resp 16   SpO2 98%     Physical Exam:  General Appearance:    Alert, cooperative, no distress, appears stated age   Head:    Normocephalic, without obvious abnormality, atraumatic   Lungs:     Clear to auscultation bilaterally, respirations unlabored    Heart:   Regular rate and rhythm, S1 and S2 normal, no murmur, rub    or gallop    Abdomen:    Soft, non-tender.  +bowel sounds   Breast Exam:    deferred   Genitalia:    deferred   Extremities:   Extremities normal, atraumatic, no cyanosis or edema   Skin:   Skin color, texture, turgor normal, no rashes or lesions   Neurologic:   Grossly intact   Results Review  LABS:  Lab Results   Component Value Date    WBC 7.99 01/09/2020    HGB 16.5 01/09/2020    HCT 48.7 01/09/2020    MCV 87.3 01/09/2020     01/09/2020    GLUCOSE 229 (H) 01/09/2020    BUN 26 (H) 01/09/2020    CREATININE 0.88 01/09/2020    EGFRIFNONA 87 01/09/2020     (L) 01/09/2020    K 4.5 01/09/2020    CL 98 01/09/2020    CO2 23.0 01/09/2020    CALCIUM 10.4 01/09/2020       RADIOLOGY:  Imaging Results (Last 72 Hours)     ** No results found for the last 72 hours. **          I reviewed the patient's new clinical results.  1/9/20 A1C 9.40.  Discussed results with pt and need for improved glucose control to prevent infection and poor wound healing.  Pt/wife verbalized understanding.  Will follow up with PCP if need for medication adjustment.    Cancer Staging (if applicable)  Cancer Patient: __ yes _x_no __unknown; If yes, clinical stage T:__ N:__M:__, stage group or __N/A    Assessment      Severe stenosis L3-4, left back and leg pain.           Plan      It is time to consider minimal access decompressive laminectomy at L3-4.  Have discussed this with him  and he agrees to proceed    Jailene Daniel, APRN   1/10/2020   8:01 AM

## 2020-01-10 NOTE — ANESTHESIA POSTPROCEDURE EVALUATION
Patient: Marshall C Collett    Procedure Summary     Date:  01/10/20 Room / [facility]:   [facility]    Anesthesia Start:  0832 Anesthesia Stop:  0952    Procedure:  LUMBAR LAMINECTOMY L3-4 (N/A Spine Lumbar) Diagnosis:       Spinal stenosis of lumbar region with neurogenic claudication      (Spinal stenosis of lumbar region with neurogenic claudication [M48.062])    Surgeon:  Dilip Brothers MD Provider:  Beryl Fernando MD    Anesthesia Type:  general ASA Status:  3          Anesthesia Type: general    Vitals  Vitals Value Taken Time   /79 1/10/2020  9:51 AM   Temp 97.9 °F (36.6 °C) 1/10/2020  9:51 AM   Pulse 76 1/10/2020  9:51 AM   Resp 16 1/10/2020  9:51 AM   SpO2 93 % 1/10/2020  9:51 AM           Post Anesthesia Care and Evaluation    Patient location during evaluation: PACU  Patient participation: complete - patient participated  Level of consciousness: awake and alert  Pain management: adequate  Airway patency: patent  Anesthetic complications: No anesthetic complications  PONV Status: none  Cardiovascular status: acceptable  Respiratory status: acceptable  Hydration status: acceptable

## 2020-01-11 VITALS
SYSTOLIC BLOOD PRESSURE: 155 MMHG | OXYGEN SATURATION: 98 % | DIASTOLIC BLOOD PRESSURE: 82 MMHG | RESPIRATION RATE: 16 BRPM | TEMPERATURE: 97.8 F | HEART RATE: 68 BPM

## 2020-01-11 PROCEDURE — 97116 GAIT TRAINING THERAPY: CPT

## 2020-01-11 PROCEDURE — 97110 THERAPEUTIC EXERCISES: CPT

## 2020-01-11 PROCEDURE — 63710000001 METFORMIN ER 500 MG TABLET SUSTAINED-RELEASE 24 HOUR: Performed by: NEUROLOGICAL SURGERY

## 2020-01-11 PROCEDURE — A9270 NON-COVERED ITEM OR SERVICE: HCPCS | Performed by: NEUROLOGICAL SURGERY

## 2020-01-11 PROCEDURE — 97165 OT EVAL LOW COMPLEX 30 MIN: CPT

## 2020-01-11 PROCEDURE — 99024 POSTOP FOLLOW-UP VISIT: CPT | Performed by: NEUROLOGICAL SURGERY

## 2020-01-11 PROCEDURE — 97162 PT EVAL MOD COMPLEX 30 MIN: CPT

## 2020-01-11 PROCEDURE — 63710000001 ACETAMINOPHEN 325 MG TABLET: Performed by: NEUROLOGICAL SURGERY

## 2020-01-11 PROCEDURE — 63710000001 LEVOTHYROXINE 150 MCG TABLET: Performed by: NEUROLOGICAL SURGERY

## 2020-01-11 RX ADMIN — METFORMIN HYDROCHLORIDE 500 MG: 500 TABLET, EXTENDED RELEASE ORAL at 08:58

## 2020-01-11 RX ADMIN — ACETAMINOPHEN 650 MG: 325 TABLET ORAL at 04:16

## 2020-01-11 RX ADMIN — LEVOTHYROXINE SODIUM 150 MCG: 150 TABLET ORAL at 05:28

## 2020-01-11 RX ADMIN — SODIUM CHLORIDE, PRESERVATIVE FREE 10 ML: 5 INJECTION INTRAVENOUS at 08:58

## 2020-01-11 NOTE — THERAPY DISCHARGE NOTE
Acute Care - Physical Therapy Initial Eval/Discharge  [facility]     Patient Name: Marshall C Collett  : 1955  MRN: [id number]  Today's Date: 2020         Referring Physician: MD Zev      Admit Date: 1/10/2020    Visit Dx:    ICD-10-CM ICD-9-CM   1. Impaired mobility and ADLs Z74.09 799.89     Patient Active Problem List   Diagnosis   • Spinal stenosis of lumbar region with neurogenic claudication   • Lumbar stenosis     Past Medical History:   Diagnosis Date   • Arthritis    • Diabetes mellitus (CMS/HCC)     DX AGE 39, CHECKS BS 2-3X/MONTH   • Disease of thyroid gland    • Hyperlipidemia      Past Surgical History:   Procedure Laterality Date   • HEMORRHOIDECTOMY            PT ASSESSMENT (last 12 hours)      Physical Therapy Evaluation    No documentation.         Physical Therapy Education                 Title: PT OT SLP Therapies (In Progress)     Topic: Physical Therapy (Done)     Point: Mobility training (Done)     Description:   Instruct learner(s) on safety and technique for assisting patient out of bed, chair or wheelchair.  Instruct in the proper use of assistive devices, such as walker, crutches, cane or brace.              Patient Friendly Description:   It's important to get you on your feet again, but we need to do so in a way that is safe for you. Falling has serious consequences, and your personal safety is the most important thing of all.        When it's time to get out of bed, one of us or a family member will sit next to you on the bed to give you support.     If your doctor or nurse tells you to use a walker, crutches, a cane, or a brace, be sure you use it every time you get out of bed, even if you think you don't need it.    Learning Progress Summary           Patient Acceptance, E,TB,D, VU,DU by CT at 2020 0936   Family Acceptance, E,TB,D, VU,DU by CT at 2020 0936                   Point: Home exercise program (Done)     Description:   Instruct learner(s) on  appropriate technique for monitoring, assisting and/or progressing patient with therapeutic exercises and activities.              Learning Progress Summary           Patient Acceptance, E,TB,D, VU,DU by CT at 1/11/2020 0936   Family Acceptance, E,TB,D, VU,DU by CT at 1/11/2020 0936                   Point: Body mechanics (Done)     Description:   Instruct learner(s) on proper positioning and spine alignment for patient and/or caregiver during mobility tasks and/or exercises.              Learning Progress Summary           Patient Acceptance, E,TB,D, VU,DU by CT at 1/11/2020 0936   Family Acceptance, E,TB,D, VU,DU by CT at 1/11/2020 0936                   Point: Precautions (Done)     Description:   Instruct learner(s) on prescribed precautions during mobility and gait tasks              Learning Progress Summary           Patient Acceptance, E,TB,D, VU,DU by CT at 1/11/2020 0936   Family Acceptance, E,TB,D, VU,DU by CT at 1/11/2020 0936                               User Key     Initials Effective Dates Name Provider Type Discipline    CT 06/19/15 -  Michi Bell, PT Physical Therapist PT                PT Recommendation and Plan  Anticipated Discharge Disposition (PT): home  Planned Therapy Interventions (PT Eval): balance training, bed mobility training, gait training, home exercise program  Therapy Frequency (PT Clinical Impression): evaluation only  Outcome Summary/Treatment Plan (PT)  Anticipated Discharge Disposition (PT): home  Plan of Care Reviewed With: patient, spouse  Outcome Summary: PT eval completed IND all acitivty and safe to ambulate community distances.  Review percautions    Outcome Measures     Row Name 01/11/20 0855             How much help from another is currently needed...    Putting on and taking off regular lower body clothing?  4  -AC      Bathing (including washing, rinsing, and drying)  4  -AC      Toileting (which includes using toilet bed pan or urinal)  4  -AC       Putting on and taking off regular upper body clothing  4  -AC      Taking care of personal grooming (such as brushing teeth)  4  -AC      Eating meals  4  -AC      AM-PAC 6 Clicks Score (OT)  24  -AC         Functional Assessment    Outcome Measure Options  AM-PAC 6 Clicks Daily Activity (OT)  -AC        User Key  (r) = Recorded By, (t) = Taken By, (c) = Cosigned By    Initials Name Provider Type    AC Katelyn Heath, OT Occupational Therapist           Time Calculation:   PT Charges     Row Name 01/11/20 0937             Time Calculation    Start Time  0850  -CT      PT Received On  01/11/20  -CT         Time Calculation- PT    Total Timed Code Minutes- PT  30 minute(s)  -CT        User Key  (r) = Recorded By, (t) = Taken By, (c) = Cosigned By    Initials Name Provider Type    CT Michi Bell, PT Physical Therapist        Therapy Charges for Today     Code Description Service Date Service Provider Modifiers Qty    59734141679 HC GAIT TRAINING EA 15 MIN 1/11/2020 Michi Bell, PT GP 1    62630050530 HC PT EVAL MOD COMPLEXITY 2 1/11/2020 Michi Bell, PT GP 1    04403314664 HC PT THER PROC EA 15 MIN 1/11/2020 Michi Bell, PT GP 1          PT G-Codes  Outcome Measure Options: AM-PAC 6 Clicks Basic Mobility (PT)  AM-PAC 6 Clicks Score (PT): 24  AM-PAC 6 Clicks Score (OT): 24    PT Discharge Summary  Anticipated Discharge Disposition (PT): home    Michi Bell PT  1/11/2020

## 2020-01-11 NOTE — PLAN OF CARE
Problem: Patient Care Overview  Goal: Plan of Care Review  Flowsheets (Taken 1/11/2020 1768)  Plan of Care Reviewed With: patient;spouse  Outcome Summary: OT eval complete.  Pt educated in log roll for bed mobility, and spinal precautions to maintain with self care.  Pt issued reacher.  Pt demo independence to don/doff socks/shoes using crossleg technique.  Pt independent with bed mobility using log roll technique.  Pt independent tub/shower transfer using lateral technique.  Pt is independent with self care and mobiltiy, but limited by impulsivity.   No further skilled OT indicated.  Recommend d/c home with spouse.

## 2020-01-11 NOTE — PLAN OF CARE
Problem: Patient Care Overview  Goal: Plan of Care Review  Outcome: Ongoing (interventions implemented as appropriate)  Flowsheets (Taken 1/10/2020 1858)  Progress: improving  Plan of Care Reviewed With: patient  Outcome Summary: pain well controlled, ambulated 800feet twice on day shift, no complaints of N/T. dressing clean and dry. voiding well.  Goal: Individualization and Mutuality  Outcome: Ongoing (interventions implemented as appropriate)  Goal: Discharge Needs Assessment  Outcome: Ongoing (interventions implemented as appropriate)  Goal: Interprofessional Rounds/Family Conf  Outcome: Ongoing (interventions implemented as appropriate)     Problem: Laminectomy/Foraminotomy/Discectomy (Adult)  Goal: Signs and Symptoms of Listed Potential Problems Will be Absent, Minimized or Managed (Laminectomy/Foraminotomy/Discectomy)  Outcome: Ongoing (interventions implemented as appropriate)  Flowsheets (Taken 1/10/2020 1858)  Problems Assessed (Laminectomy/Laminotomy/Discectomy): all  Problems Present (Laminectomy/otomy): none  Goal: Anesthesia/Sedation Recovery  Outcome: Ongoing (interventions implemented as appropriate)  Flowsheets (Taken 1/10/2020 1858)  Anesthesia/Sedation Recovery: recovered to baseline

## 2020-01-11 NOTE — DISCHARGE SUMMARY
DISCHARGE SUMMARY    Date of Admission: 1/10/2020    Date of Discharge:  1/11/2020    Discharge Diagnosis: Lumbar stenosis L3-4    Procedures Performed:  Procedure(s):  LUMBAR LAMINECTOMY L3-4    Referring Physician: Alex Ricci PA-C    Presenting Problem/History of Present Illness  Spinal stenosis of lumbar region with neurogenic claudication [M48.062]  Lumbar stenosis [M48.061]     Hospital Course  Patient is a 64 y.o. male who presented with symptoms of back pain with pain radiating to the left hip and leg which had developed over a period of a year or more.  It bothers him particularly at night and also when active such as lifting or bending or twisting, standing for a long time.  Lumbar MRI scan showed a severe stenosis at L3-4.    On the day of admission a minimal access lumbar laminectomy at L3-4 was performed.  There were no surgical complications.  The patient resumed ambulation immediately after surgery and experienced full relief of the left hip and leg pain.  Blood sugar was somewhat elevated after surgery associated with use of epidural steroid during the surgery.  Patient was able to take a regular diet and void without difficulty.  The patient was discharged home on the morning following surgery.  As there was no pain of significance experienced after surgery there were no discharge pain medications prescribed.  Follow-up will be in [place] in 4 weeks.    Discharge Medications     Discharge Medications      Continue These Medications      Instructions Start Date   ALLERGY RELIEF 10 MG tablet  Generic drug:  loratadine   10 mg, Oral, Daily      atorvastatin 10 MG tablet  Commonly known as:  LIPITOR   10 mg, Oral, Every Night at Bedtime      GLYXAMBI 25-5 MG tablet  Generic drug:  Empagliflozin-linaGLIPtin   1 tablet, Oral, Daily      levothyroxine 150 MCG tablet  Commonly known as:  SYNTHROID, LEVOTHROID   150 mcg, Oral, Daily      metFORMIN  MG 24 hr tablet  Commonly known as:  GLUCOPHAGE-XR    500 mg, Oral, Daily With Breakfast      VAQTA 50 UNIT/ML injection  Generic drug:  hepatitis A vaccine   PHARMACIST ADMINISTERED IMMUNIZATION ADMINISTERED AT TIME OF DISPENSING             Dilip Brothers MD  01/11/20  8:23 AM

## 2020-01-11 NOTE — PLAN OF CARE
Problem: Patient Care Overview  Goal: Plan of Care Review  Outcome: Ongoing (interventions implemented as appropriate)  Flowsheets  Taken 1/11/2020 0332  Progress: improving  Outcome Summary: Minimal pain, controlled with tylenol. Dressing CDI. VSS. Slept well tonight. A&Ox4.  Taken 1/10/2020 2027  Plan of Care Reviewed With: patient

## 2020-01-11 NOTE — THERAPY DISCHARGE NOTE
Acute Care - Occupational Therapy Initial Eval/Discharge  [facility]     Patient Name: Marshall C Collett  : 1955  MRN: [id number]  Today's Date: 2020     Date of Referral to OT: 01/10/20  Referring Physician: MD Zev      Admit Date: 1/10/2020       ICD-10-CM ICD-9-CM   1. Impaired mobility and ADLs Z74.09 799.89     Patient Active Problem List   Diagnosis   • Spinal stenosis of lumbar region with neurogenic claudication   • Lumbar stenosis     Past Medical History:   Diagnosis Date   • Arthritis    • Diabetes mellitus (CMS/HCC)     DX AGE 39, CHECKS BS 2-3X/MONTH   • Disease of thyroid gland    • Hyperlipidemia      Past Surgical History:   Procedure Laterality Date   • HEMORRHOIDECTOMY            OT ASSESSMENT FLOWSHEET (last 12 hours)      Occupational Therapy Evaluation     Row Name 20 0855                   OT Evaluation Time/Intention    Document Type  discharge evaluation/summary  -AC        Mode of Treatment  occupational therapy  -AC        Patient Effort  excellent  -AC           General Information    Patient Profile Reviewed?  yes  -AC        Referring Physician  MD Zev  -AC        Prior Level of Function  -- increased pain and effort with self care  -AC        Equipment Currently Used at Home  none  -AC        Pertinent History of Current Functional Problem  S/P lumbar laminectomy L3 - L4  -AC        Existing Precautions/Restrictions  spinal  -AC        Equipment Issued to Patient  reacher  -AC           Relationship/Environment    Primary Source of Support/Comfort  spouse  -AC        Lives With  spouse  -AC           Resource/Environmental Concerns    Current Living Arrangements  home/apartment/condo  -           Home Main Entrance    Number of Stairs, Main Entrance  ten  -AC        Stair Railings, Main Entrance  railings on both sides of stairs  -AC           Cognitive Assessment/Interventions    Additional Documentation  Cognitive Assessment/Intervention (Group)  -AC            Cognitive Assessment/Intervention- PT/OT    Orientation Status (Cognition)  oriented x 4  -AC        Follows Commands (Cognition)  WFL  -AC        Safety Deficit (Cognitive)  impulsivity  -AC           Safety Issues, Functional Mobility    Safety Issues Affecting Function (Mobility)  impulsivity  -AC           Bed Mobility Assessment/Treatment    Bed Mobility Assessment/Treatment  supine-sit;sit-supine log roll  -        Supine-Sit [place] (Bed Mobility)  verbal cues;independent  -AC        Sit-Supine [place] (Bed Mobility)  verbal cues;independent  -AC        Comment (Bed Mobility)  educated in log roll technique  -           Functional Mobility    Functional Mobility- Ind. Level  independent  -AC        Functional Mobility- Device  -- gt belt  -        Functional Mobility-Distance (Feet)  -- in hallway  -           Transfer Assessment/Treatment    Transfer Assessment/Treatment  sit-stand transfer;stand-sit transfer;bathtub transfer  -AC           Sit-Stand Transfer    Sit-Stand [place] (Transfers)  independent  -AC           Stand-Sit Transfer    Stand-Sit [place] (Transfers)  independent  -           Bathtub Transfer    Type (Bathtub Transfer)  lateral  -        Mar[place] Level (Bathtub Transfer)  independent;verbal cues  -           ADL Assessment/Intervention    97621 - OT Self Care/Mgmt Minutes  5  -AC        BADL Assessment/Intervention  lower body dressing  -AC        Additional Documentation  -- educated tto maintain spinal precautions with self care  -AC           Lower Body Dressing Assessment/Training    Lower Body Dressing [place] Level  doff;don;socks;shoes/slippers;independent  -AC        Lower Body Dressing Position  edge of bed sitting  -AC        Comment (Lower Body Dressing)  crossleg technique  -           BADL Safety/Performance    Impairments, BADL Safety/Performance  -- spinal preccautions  -           General ROM    GENERAL ROM COMMENTS  ROBERT  AROM WFL  -AC           MMT (Manual Muscle Testing)    General MMT Comments  WFL; did not give resistance  -AC           Sensory Assessment/Intervention    Sensory General Assessment  no sensation deficits identified BUE  -AC           Positioning and Restraints    Pre-Treatment Position  in bed  -AC        Post Treatment Position  other  -AC        Other Position  -- with PT up in room  -AC           Pain Assessment    Additional Documentation  Pain Scale: Numbers Pre/Post-Treatment (Group)  -AC           Pain Scale: Numbers Pre/Post-Treatment    Pain Scale: Numbers, Pretreatment  0/10 - no pain  -AC        Pain Scale: Numbers, Post-Treatment  0/10 - no pain  -AC           Wound 01/10/20 0927 lower lumbar spine Incision    Wound - Properties Group Date first assessed: 01/10/20  -TK Time first assessed: 0927  -TK Present on Hospital Admission: N  -TK Orientation: lower  -TK Location: lumbar spine  -TK Primary Wound Type: Incision  -TK       Plan of Care Review    Plan of Care Reviewed With  patient;spouse  -        Outcome Summary  OT eval complete.  Pt educated in log roll for bed mobility, and spinal precautions to maintain with self care.  Pt issued reacher.  Pt demo indpendence to don/doff socks/shoes using crossleg technique.  Pt independent with bed mobility using log roll technique.  Pt independent tub/shower transfer using lateral technique.  Pt is independent with self care and mobiltiy, but limited by impulsivity.   No further skilled OT indicated.  Recommend d/c home with spouse.   -AC           Clinical Impression (OT)    Date of Referral to OT  01/10/20  -        Patient/Family Goals Statement (OT Eval)  return home  -        Criteria for Skilled Therapeutic Interventions Met (OT Eval)  -- pt to d/c home today  -        Therapy Frequency (OT Eval)  evaluation only  -AC        Care Plan Review (OT)  evaluation/treatment results reviewed  -AC        Care Plan Review, Other Participant (OT Eval)   spouse  -AC        Anticipated Discharge Disposition (OT)  home  -           Living Environment    Home Accessibility  tub/shower is not walk in;stairs to enter home  -          User Key  (r) = Recorded By, (t) = Taken By, (c) = Cosigned By    Initials Name Effective Dates     Katelyn Heath, OT 06/23/15 -     Lesli Valladares RN 06/16/16 -           Occupational Therapy Education                 Title: PT OT SLP Therapies (In Progress)     Topic: Occupational Therapy (In Progress)     Point: ADL training (Done)     Description:   Instruct learner(s) on proper safety adaptation and remediation techniques during self care or transfers.   Instruct in proper use of assistive devices.              Learning Progress Summary           Patient Acceptance, E,TB,D, VU,DU,NR by  at 1/11/2020 0855    Comment:  role of OT, spinal precautions to maintain with self care, use of reacher, log roll for bed mobility,  tub shower transfer training   Family Acceptance, E,TB,D, VU,DU,NR by  at 1/11/2020 0855    Comment:  role of OT, spinal precautions to maintain with self care, use of reacher, log roll for bed mobility,  tub shower transfer training                   Point: Precautions (Done)     Description:   Instruct learner(s) on prescribed precautions during self-care and functional transfers.              Learning Progress Summary           Patient Acceptance, E,TB,D, VU,DU,NR by  at 1/11/2020 0855    Comment:  role of OT, spinal precautions to maintain with self care, use of reacher, log roll for bed mobility,  tub shower transfer training   Family Acceptance, E,TB,D, VU,DU,NR by  at 1/11/2020 0855    Comment:  role of OT, spinal precautions to maintain with self care, use of reacher, log roll for bed mobility,  tub shower transfer training                   Point: Body mechanics (Done)     Description:   Instruct learner(s) on proper positioning and spine alignment during self-care, functional mobility activities  and/or exercises.              Learning Progress Summary           Patient Acceptance, E,TB,D, VU,DU,NR by  at 1/11/2020 0855    Comment:  role of OT, spinal precautions to maintain with self care, use of reacher, log roll for bed mobility,  tub shower transfer training   Family Acceptance, E,TB,D, VU,DU,NR by  at 1/11/2020 0855    Comment:  role of OT, spinal precautions to maintain with self care, use of reacher, log roll for bed mobility,  tub shower transfer training                               User Key     Initials Effective Dates Name Provider Type Discipline     06/23/15 -  Katelyn Heath, OT Occupational Therapist OT                OT Recommendation and Plan  Outcome Summary/Treatment Plan (OT)  Anticipated Discharge Disposition (OT): home  Therapy Frequency (OT Eval): evaluation only  Plan of Care Review  Plan of Care Reviewed With: patient, spouse  Plan of Care Reviewed With: patient, spouse  Outcome Summary: OT eval complete.  Pt educated in log roll for bed mobility, and spinal precautions to maintain with self care.  Pt issued reacher.  Pt demo indpendence to don/doff socks/shoes using crossleg technique.  Pt independent with bed mobility using log roll technique.  Pt independent tub/shower transfer using lateral technique.  Pt is independent with self care and mobiltiy, but limited by impulsivity.   No further skilled OT indicated.  Recommend d/c home with spouse.          Outcome Measures     Row Name 01/11/20 0855             How much help from another is currently needed...    Putting on and taking off regular lower body clothing?  4  -AC      Bathing (including washing, rinsing, and drying)  4  -AC      Toileting (which includes using toilet bed pan or urinal)  4  -AC      Putting on and taking off regular upper body clothing  4  -AC      Taking care of personal grooming (such as brushing teeth)  4  -AC      Eating meals  4  -AC      AM-PAC 6 Clicks Score (OT)  24  -AC         Functional  Assessment    Outcome Measure Options  AM-PAC 6 Clicks Daily Activity (OT)  -AC        User Key  (r) = Recorded By, (t) = Taken By, (c) = Cosigned By    Initials Name Provider Type    Katelyn Call, OT Occupational Therapist          Time Calculation:   Time Calculation- OT     Row Name 01/11/20 0855             Time Calculation- OT    OT Start Time  0855  -AC      OT Received On  01/11/20  -AC         Timed Charges    45430 - OT Self Care/Mgmt Minutes  5  -AC        User Key  (r) = Recorded By, (t) = Taken By, (c) = Cosigned By    Initials Name Provider Type    Katelyn Call, OT Occupational Therapist        Therapy Suggested Charges     Code   Minutes Charges    48921 (CPT®) Hc Ot Neuromusc Re Education Ea 15 Min      72601 (CPT®) Hc Ot Ther Proc Ea 15 Min      50339 (CPT®) Hc Ot Therapeutic Act Ea 15 Min      56610 (CPT®) Hc Ot Manual Therapy Ea 15 Min      91624 (CPT®) Hc Ot Iontophoresis Ea 15 Min      41835 (CPT®) Hc Ot Elec Stim Ea-Per 15 Min      13306 (CPT®) Hc Ot Ultrasound Ea 15 Min      33513 (CPT®) Hc Ot Self Care/Mgmt/Train Ea 15 Min 5     Total  5         Therapy Charges for Today     Code Description Service Date Service Provider Modifiers Qty    [id number] HC OT EVAL LOW COMPLEXITY 4 1/11/2020 Katelyn Heath OT GO 1               OT Discharge Summary  Anticipated Discharge Disposition (OT): home    Katelyn Heath OT  1/11/2020

## 2020-02-10 ENCOUNTER — OFFICE VISIT (OUTPATIENT)
Dept: NEUROSURGERY | Facility: CLINIC | Age: 65
End: 2020-02-10

## 2020-02-10 VITALS — TEMPERATURE: 98.6 F | HEIGHT: 74 IN | WEIGHT: 200 LBS | BODY MASS INDEX: 25.67 KG/M2

## 2020-02-10 DIAGNOSIS — M48.062 SPINAL STENOSIS OF LUMBAR REGION WITH NEUROGENIC CLAUDICATION: Primary | ICD-10-CM

## 2020-02-10 DIAGNOSIS — M51.36 BULGING LUMBAR DISC: ICD-10-CM

## 2020-02-10 PROCEDURE — 99024 POSTOP FOLLOW-UP VISIT: CPT | Performed by: NEUROLOGICAL SURGERY

## 2020-02-10 NOTE — PROGRESS NOTES
Subjective   Marshall C Collett is a 64 y.o. male who presents for follow up of lumbar laminectomy L3-4 on 1/10/2020.     The patient developed back pain with symptoms radiating to his left hip and leg over a period of a year or more.  Lumbar MRI scan showed severe stenosis at developed at L3-4.  The patient identified this is a long-term related to his work experience.    A minimal access lumbar laminectomy was performed a month ago, L3-4, with excellent results and good relief of neurogenic claudication.    The following portions of the patient's history were reviewed, updated as appropriate and approved: allergies, current medications, past family history, past medical history, past social history, past surgical history, review of systems and problem list.     Review of Systems   Endocrine: Positive for cold intolerance.   Musculoskeletal: Positive for back pain.       Objective    NEUROLOGICAL EXAMINATION:    Lumbar incision is healing well with a small scab that should resolve with time.    Assessment   Postop minimal access lumbar laminectomy L3-4.  Good result at this time.       Plan   There is no follow-up necessary at this point.  The patient has sought help from his  chronic wear and tear injury.       Dilip Brothers MD

## (undated) DEVICE — CVR HNDL LT SURG ACCSSRY BLU STRL

## (undated) DEVICE — DRP SLUSH WARM STRL 44X44IN

## (undated) DEVICE — 3.0MM PRECISION NEURO (MATCH HEAD)

## (undated) DEVICE — GLV SURG PREMIERPRO MIC LTX PF SZ6.5 BRN

## (undated) DEVICE — SKIN AFFIX SURG ADHESIVE 72/CS 0.55ML: Brand: MEDLINE

## (undated) DEVICE — SUT VIC 0 UR6 27IN VCP603H

## (undated) DEVICE — PK MED 10

## (undated) DEVICE — HOLDER: Brand: DEROYAL

## (undated) DEVICE — HDRST INTUB GENTLETOUCH SLOT 7IN RT

## (undated) DEVICE — DRSNG WND BORDR/ADHS NONADHR/GZ LF 4X4IN STRL

## (undated) DEVICE — GLV SURG PREMIERPRO MIC LTX PF SZ8 BRN

## (undated) DEVICE — INSTRUMENT 9560702 RADIANCE ILLUMIN SYS: Brand: METRX® SYSTEM

## (undated) DEVICE — DIFFUSER: Brand: CORE, MAESTRO

## (undated) DEVICE — OIL CARTRIDGE: Brand: CORE, MAESTRO

## (undated) DEVICE — GLV SURG PREMIERPRO MIC LTX PF SZ8.5 BRN

## (undated) DEVICE — 3M™ WARMING BLANKET, UPPER BODY, 10 PER CASE, 42268: Brand: BAIR HUGGER™

## (undated) DEVICE — GLV SURG PREMIERPRO MIC LTX PF SZ6 BRN

## (undated) DEVICE — ANTIBACTERIAL UNDYED BRAIDED (POLYGLACTIN 910), SYNTHETIC ABSORBABLE SUTURE: Brand: COATED VICRYL